# Patient Record
Sex: FEMALE | Race: WHITE | Employment: FULL TIME | URBAN - METROPOLITAN AREA
[De-identification: names, ages, dates, MRNs, and addresses within clinical notes are randomized per-mention and may not be internally consistent; named-entity substitution may affect disease eponyms.]

---

## 2018-01-08 ENCOUNTER — OFFICE VISIT (OUTPATIENT)
Dept: OBGYN CLINIC | Facility: CLINIC | Age: 43
End: 2018-01-08

## 2018-01-08 VITALS
SYSTOLIC BLOOD PRESSURE: 116 MMHG | BODY MASS INDEX: 26.48 KG/M2 | HEIGHT: 67 IN | DIASTOLIC BLOOD PRESSURE: 70 MMHG | WEIGHT: 168.69 LBS

## 2018-01-08 DIAGNOSIS — Z01.419 WELL WOMAN EXAM WITH ROUTINE GYNECOLOGICAL EXAM: Primary | ICD-10-CM

## 2018-01-08 DIAGNOSIS — Z12.31 SCREENING MAMMOGRAM, ENCOUNTER FOR: ICD-10-CM

## 2018-01-08 PROCEDURE — 99396 PREV VISIT EST AGE 40-64: CPT | Performed by: ADVANCED PRACTICE MIDWIFE

## 2018-01-08 RX ORDER — BUPROPION HYDROCHLORIDE 150 MG/1
TABLET ORAL
Refills: 0 | COMMUNITY
Start: 2018-01-05 | End: 2020-06-10

## 2018-01-08 RX ORDER — FLUOXETINE 10 MG/1
CAPSULE ORAL
Refills: 4 | COMMUNITY
Start: 2017-12-27 | End: 2020-06-10

## 2018-01-08 NOTE — PROGRESS NOTES
HPI:    Patient ID: Larissa Willis is a 38year old female. Is under a great deal stress due to mother dying of cancer. Has mirena and will be due to be replaced in 2020        Review of Systems   Constitutional: Negative. Respiratory: Negative.     Ca

## 2018-01-10 LAB — HPV I/H RISK 1 DNA SPEC QL NAA+PROBE: NEGATIVE

## 2019-01-09 ENCOUNTER — OFFICE VISIT (OUTPATIENT)
Dept: OBGYN CLINIC | Facility: CLINIC | Age: 44
End: 2019-01-09
Payer: COMMERCIAL

## 2019-01-09 VITALS
BODY MASS INDEX: 27 KG/M2 | WEIGHT: 174 LBS | DIASTOLIC BLOOD PRESSURE: 73 MMHG | SYSTOLIC BLOOD PRESSURE: 114 MMHG | HEART RATE: 57 BPM

## 2019-01-09 DIAGNOSIS — Z12.31 ENCOUNTER FOR SCREENING MAMMOGRAM FOR BREAST CANCER: Primary | ICD-10-CM

## 2019-01-09 DIAGNOSIS — F32.0 CURRENT MILD EPISODE OF MAJOR DEPRESSIVE DISORDER WITHOUT PRIOR EPISODE (HCC): ICD-10-CM

## 2019-01-09 DIAGNOSIS — Z01.419 ENCOUNTER FOR WELL WOMAN EXAM WITH ROUTINE GYNECOLOGICAL EXAM: ICD-10-CM

## 2019-01-09 PROCEDURE — 99396 PREV VISIT EST AGE 40-64: CPT | Performed by: ADVANCED PRACTICE MIDWIFE

## 2019-01-09 RX ORDER — BUPROPION HYDROCHLORIDE 150 MG/1
150 TABLET ORAL DAILY
Qty: 90 TABLET | Refills: 3 | Status: SHIPPED | OUTPATIENT
Start: 2019-01-09 | End: 2020-05-04

## 2019-01-09 RX ORDER — FLUOXETINE 10 MG/1
10 CAPSULE ORAL DAILY
Qty: 90 CAPSULE | Refills: 3 | Status: SHIPPED | OUTPATIENT
Start: 2019-01-09 | End: 2020-06-10

## 2019-01-09 NOTE — PROGRESS NOTES
HPI:    Patient ID: Magdalena Bridges is a 37year old female. Here for annual exam.      PMSFH reviewed and updated.   IUD    12 pt ROS was performed and noted in HPI otherwise negative    IUD for contraception    Needs refill of antidepressant, has been [facility] thyromegaly present. Cardiovascular: Normal rate, regular rhythm and normal heart sounds. No murmur heard. Pulmonary/Chest: Effort normal and breath sounds normal. No respiratory distress. She exhibits no tenderness.  Right breast exhibits no inverted capsule Rfl: 3   BuPROPion HCl ER, XL, 150 MG Oral Tablet 24 Hr Take 1 tablet (150 mg total) by mouth daily.  Disp: 90 tablet Rfl: 3   FLUoxetine HCl 10 MG Oral Cap TK 1 C PO QAM Disp:  Rfl: 4   BuPROPion HCl ER, XL, 150 MG Oral Tablet 24 Hr TK 1 T PO QAM D encounter. Meds This Visit:  Requested Prescriptions     Signed Prescriptions Disp Refills   • FLUoxetine HCl 10 MG Oral Cap 90 capsule 3     Sig: Take 1 capsule (10 mg total) by mouth daily.    • BuPROPion HCl ER, XL, 150 MG Oral Tablet 24 Hr 90 table

## 2020-05-02 DIAGNOSIS — F32.0 CURRENT MILD EPISODE OF MAJOR DEPRESSIVE DISORDER WITHOUT PRIOR EPISODE (HCC): ICD-10-CM

## 2020-05-04 RX ORDER — BUPROPION HYDROCHLORIDE 150 MG/1
TABLET ORAL
Qty: 90 TABLET | Refills: 3 | Status: SHIPPED | OUTPATIENT
Start: 2020-05-04 | End: 2020-06-10

## 2020-05-04 NOTE — TELEPHONE ENCOUNTER
Left detailed message for pt informing of a possible active script in Epic. Pt to call back if any questions.

## 2020-05-08 ENCOUNTER — TELEPHONE (OUTPATIENT)
Dept: OBGYN CLINIC | Facility: CLINIC | Age: 45
End: 2020-05-08

## 2020-06-10 ENCOUNTER — OFFICE VISIT (OUTPATIENT)
Dept: OBGYN CLINIC | Facility: CLINIC | Age: 45
End: 2020-06-10
Payer: COMMERCIAL

## 2020-06-10 VITALS
WEIGHT: 172 LBS | BODY MASS INDEX: 27 KG/M2 | SYSTOLIC BLOOD PRESSURE: 115 MMHG | DIASTOLIC BLOOD PRESSURE: 68 MMHG | HEIGHT: 67 IN

## 2020-06-10 DIAGNOSIS — Z12.31 SCREENING MAMMOGRAM, ENCOUNTER FOR: ICD-10-CM

## 2020-06-10 DIAGNOSIS — F32.0 CURRENT MILD EPISODE OF MAJOR DEPRESSIVE DISORDER WITHOUT PRIOR EPISODE (HCC): ICD-10-CM

## 2020-06-10 DIAGNOSIS — Z01.419 WOMEN'S ANNUAL ROUTINE GYNECOLOGICAL EXAMINATION: Primary | ICD-10-CM

## 2020-06-10 DIAGNOSIS — Z30.430 ENCOUNTER FOR IUD INSERTION: ICD-10-CM

## 2020-06-10 PROCEDURE — 58300 INSERT INTRAUTERINE DEVICE: CPT | Performed by: ADVANCED PRACTICE MIDWIFE

## 2020-06-10 PROCEDURE — 99396 PREV VISIT EST AGE 40-64: CPT | Performed by: ADVANCED PRACTICE MIDWIFE

## 2020-06-10 PROCEDURE — 58301 REMOVE INTRAUTERINE DEVICE: CPT | Performed by: ADVANCED PRACTICE MIDWIFE

## 2020-06-10 RX ORDER — FLUOXETINE 10 MG/1
10 TABLET, FILM COATED ORAL DAILY
COMMUNITY
End: 2020-06-10

## 2020-06-10 RX ORDER — FLUOXETINE 10 MG/1
10 TABLET, FILM COATED ORAL DAILY
Qty: 90 TABLET | Refills: 3 | Status: SHIPPED | OUTPATIENT
Start: 2020-06-10 | End: 2021-09-24

## 2020-06-10 RX ORDER — SERTRALINE HYDROCHLORIDE 100 MG/1
100 TABLET, FILM COATED ORAL DAILY
COMMUNITY
End: 2020-06-10

## 2020-06-10 RX ORDER — BUPROPION HYDROCHLORIDE 150 MG/1
150 TABLET, EXTENDED RELEASE ORAL 2 TIMES DAILY
COMMUNITY
End: 2020-06-10

## 2020-06-10 RX ORDER — BUPROPION HYDROCHLORIDE 150 MG/1
150 TABLET ORAL DAILY
Qty: 90 TABLET | Refills: 3 | Status: SHIPPED | OUTPATIENT
Start: 2020-06-10 | End: 2021-08-22

## 2020-06-10 NOTE — PROCEDURES
[facility], [facility]  Obstetrics and Gynecology  IUD Removal and IUD Insertion Procedure Note  Zeferino Choe CNM, ALEXANDRU    Philip Bertrand is a 53year old female presenting for IUD Removal and Insertion. Pelvic Exam Findings:  Cervix normal. Uterus AV.  [place] check.      Attila De Jesus, APRN  3:54 PM  6/10/2020

## 2020-06-10 NOTE — PROGRESS NOTES
HPI:    Patient ID: Kevyn Zhu is a 53year old female. Has not had any health problems this year. Is taking fluoxetine and buproprion for depression.       Needs a mirena replacement      Review of Systems   All other systems reviewed and are negativ No      Exercise: walking. Diet: doesn't watch              Current Outpatient Medications   Medication Sig Dispense Refill   • FLUoxetine HCl 10 MG Oral Tab Take 10 mg by mouth daily.      • buPROPion HCl ER, SR, 150 MG Oral Tablet 12 Hr Take 150 mg by mo labia. Uterus is not deviated, not enlarged and not tender. Cervix exhibits no motion tenderness, no discharge and no friability. Right adnexum displays no mass, no tenderness and no fullness. Left adnexum displays no mass, no tenderness and no fullness.

## 2020-06-23 ENCOUNTER — HOSPITAL ENCOUNTER (OUTPATIENT)
Dept: MAMMOGRAPHY | Age: 45
Discharge: HOME | End: 2020-06-23
Attending: ADVANCED PRACTICE MIDWIFE
Payer: COMMERCIAL

## 2020-06-23 DIAGNOSIS — Z12.31 SCREENING MAMMOGRAM, ENCOUNTER FOR: ICD-10-CM

## 2020-06-23 PROCEDURE — 77063 BREAST TOMOSYNTHESIS BI: CPT | Performed by: ADVANCED PRACTICE MIDWIFE

## 2020-06-23 PROCEDURE — 77067 SCR MAMMO BI INCL CAD: CPT | Performed by: ADVANCED PRACTICE MIDWIFE

## 2021-06-21 ENCOUNTER — OFFICE VISIT (OUTPATIENT)
Dept: OBGYN CLINIC | Facility: CLINIC | Age: 46
End: 2021-06-21
Payer: COMMERCIAL

## 2021-06-21 VITALS — DIASTOLIC BLOOD PRESSURE: 62 MMHG | SYSTOLIC BLOOD PRESSURE: 110 MMHG | BODY MASS INDEX: 28 KG/M2 | WEIGHT: 179 LBS

## 2021-06-21 DIAGNOSIS — Z01.419 ENCOUNTER FOR WELL WOMAN EXAM WITH ROUTINE GYNECOLOGICAL EXAM: Primary | ICD-10-CM

## 2021-06-21 DIAGNOSIS — Z13.21 ENCOUNTER FOR VITAMIN DEFICIENCY SCREENING: ICD-10-CM

## 2021-06-21 DIAGNOSIS — F32.A DEPRESSION, UNSPECIFIED DEPRESSION TYPE: ICD-10-CM

## 2021-06-21 DIAGNOSIS — Z12.31 ENCOUNTER FOR SCREENING MAMMOGRAM FOR BREAST CANCER: ICD-10-CM

## 2021-06-21 PROCEDURE — 99396 PREV VISIT EST AGE 40-64: CPT | Performed by: ADVANCED PRACTICE MIDWIFE

## 2021-06-21 PROCEDURE — 3074F SYST BP LT 130 MM HG: CPT | Performed by: ADVANCED PRACTICE MIDWIFE

## 2021-06-21 PROCEDURE — 3078F DIAST BP <80 MM HG: CPT | Performed by: ADVANCED PRACTICE MIDWIFE

## 2021-06-21 NOTE — PROGRESS NOTES
HPI/Subjective:   Patient ID: Keith Louie is a 50year old female. Here for annual.  Has had an increase in depression since staying home during covid.   Is seeking a referral for a psychiatrist. No other change in health in last year    [address] [facility] Mei reviewed by mouth daily. • PAZEO 0.7 % Ophthalmic Solution  (Patient not taking: Reported on 6/21/2021 )  4   • Multiple Vitamin (MULTI-VITAMIN) Oral Tab Take 1 tablet by mouth daily.  (Patient not taking: Reported on 6/21/2021 )     • Multiple Vitamins-Minerals

## 2021-06-30 ENCOUNTER — LAB ENCOUNTER (OUTPATIENT)
Dept: LAB | Facility: REFERENCE LAB | Age: 46
End: 2021-06-30
Attending: ADVANCED PRACTICE MIDWIFE
Payer: COMMERCIAL

## 2021-06-30 ENCOUNTER — HOSPITAL ENCOUNTER (OUTPATIENT)
Dept: MAMMOGRAPHY | Age: 46
Discharge: HOME | End: 2021-06-30
Attending: ADVANCED PRACTICE MIDWIFE
Payer: COMMERCIAL

## 2021-06-30 DIAGNOSIS — Z13.21 ENCOUNTER FOR VITAMIN DEFICIENCY SCREENING: ICD-10-CM

## 2021-06-30 DIAGNOSIS — Z01.419 ENCOUNTER FOR WELL WOMAN EXAM WITH ROUTINE GYNECOLOGICAL EXAM: ICD-10-CM

## 2021-06-30 DIAGNOSIS — Z12.31 ENCOUNTER FOR SCREENING MAMMOGRAM FOR BREAST CANCER: ICD-10-CM

## 2021-06-30 LAB
25(OH)D3 SERPL-MCNC: 28 NG/ML (ref 30–100)
ALBUMIN SERPL-MCNC: 3.6 G/DL (ref 3.4–5)
ALBUMIN/GLOB SERPL: 1.1 {RATIO} (ref 1–2)
ALP LIVER SERPL-CCNC: 31 U/L
ALT SERPL-CCNC: 20 U/L
ANION GAP SERPL CALC-SCNC: 6 MMOL/L (ref 0–18)
AST SERPL-CCNC: 14 U/L (ref 15–37)
BILIRUB SERPL-MCNC: 0.6 MG/DL (ref 0.1–2)
BUN BLD-MCNC: 14 MG/DL (ref 7–18)
BUN/CREAT SERPL: 16.1 (ref 10–20)
CALCIUM BLD-MCNC: 8.9 MG/DL (ref 8.5–10.1)
CHLORIDE SERPL-SCNC: 110 MMOL/L (ref 98–112)
CHOLEST SMN-MCNC: 209 MG/DL (ref ?–200)
CO2 SERPL-SCNC: 24 MMOL/L (ref 21–32)
CREAT BLD-MCNC: 0.87 MG/DL
DEPRECATED RDW RBC AUTO: 40.5 FL (ref 35.1–46.3)
ERYTHROCYTE [DISTWIDTH] IN BLOOD BY AUTOMATED COUNT: 12.2 % (ref 11–15)
EST. AVERAGE GLUCOSE BLD GHB EST-MCNC: 97 MG/DL (ref 68–126)
GLOBULIN PLAS-MCNC: 3.4 G/DL (ref 2.8–4.4)
GLUCOSE BLD-MCNC: 88 MG/DL (ref 70–99)
HBA1C MFR BLD HPLC: 5 % (ref ?–5.7)
HCT VFR BLD AUTO: 41.7 %
HDLC SERPL-MCNC: 63 MG/DL (ref 40–59)
HGB BLD-MCNC: 14 G/DL
LDLC SERPL CALC-MCNC: 134 MG/DL (ref ?–100)
M PROTEIN MFR SERPL ELPH: 7 G/DL (ref 6.4–8.2)
MCH RBC QN AUTO: 30.5 PG (ref 26–34)
MCHC RBC AUTO-ENTMCNC: 33.6 G/DL (ref 31–37)
MCV RBC AUTO: 90.8 FL
NONHDLC SERPL-MCNC: 146 MG/DL (ref ?–130)
OSMOLALITY SERPL CALC.SUM OF ELEC: 290 MOSM/KG (ref 275–295)
PATIENT FASTING Y/N/NP: YES
PATIENT FASTING Y/N/NP: YES
PLATELET # BLD AUTO: 235 10(3)UL (ref 150–450)
POTASSIUM SERPL-SCNC: 3.9 MMOL/L (ref 3.5–5.1)
RBC # BLD AUTO: 4.59 X10(6)UL
SODIUM SERPL-SCNC: 140 MMOL/L (ref 136–145)
TRIGL SERPL-MCNC: 67 MG/DL (ref 30–149)
TSI SER-ACNC: 1.94 MIU/ML (ref 0.36–3.74)
VLDLC SERPL CALC-MCNC: 12 MG/DL (ref 0–30)
WBC # BLD AUTO: 6 X10(3) UL (ref 4–11)

## 2021-06-30 PROCEDURE — 77063 BREAST TOMOSYNTHESIS BI: CPT | Performed by: ADVANCED PRACTICE MIDWIFE

## 2021-06-30 PROCEDURE — 83036 HEMOGLOBIN GLYCOSYLATED A1C: CPT

## 2021-06-30 PROCEDURE — 80053 COMPREHEN METABOLIC PANEL: CPT

## 2021-06-30 PROCEDURE — 85027 COMPLETE CBC AUTOMATED: CPT

## 2021-06-30 PROCEDURE — 82306 VITAMIN D 25 HYDROXY: CPT

## 2021-06-30 PROCEDURE — 80061 LIPID PANEL: CPT

## 2021-06-30 PROCEDURE — 36415 COLL VENOUS BLD VENIPUNCTURE: CPT

## 2021-06-30 PROCEDURE — 77067 SCR MAMMO BI INCL CAD: CPT | Performed by: ADVANCED PRACTICE MIDWIFE

## 2021-06-30 PROCEDURE — 84443 ASSAY THYROID STIM HORMONE: CPT

## 2021-08-04 PROBLEM — F33.1 MAJOR DEPRESSIVE DISORDER, RECURRENT, MODERATE (HCC): Status: ACTIVE | Noted: 2021-08-04

## 2021-08-20 DIAGNOSIS — F32.0 CURRENT MILD EPISODE OF MAJOR DEPRESSIVE DISORDER WITHOUT PRIOR EPISODE (HCC): ICD-10-CM

## 2021-08-22 RX ORDER — BUPROPION HYDROCHLORIDE 150 MG/1
TABLET ORAL
Qty: 90 TABLET | Refills: 3 | Status: SHIPPED | OUTPATIENT
Start: 2021-08-22

## 2021-09-24 RX ORDER — FLUOXETINE 10 MG/1
TABLET, FILM COATED ORAL
Qty: 90 TABLET | Refills: 3 | Status: SHIPPED | OUTPATIENT
Start: 2021-09-24

## 2023-04-25 ENCOUNTER — OFFICE VISIT (OUTPATIENT)
Dept: OBGYN CLINIC | Facility: CLINIC | Age: 48
End: 2023-04-25

## 2023-04-25 VITALS
WEIGHT: 182 LBS | DIASTOLIC BLOOD PRESSURE: 82 MMHG | BODY MASS INDEX: 28.56 KG/M2 | HEIGHT: 67 IN | HEART RATE: 50 BPM | SYSTOLIC BLOOD PRESSURE: 119 MMHG

## 2023-04-25 DIAGNOSIS — Z12.31 ENCOUNTER FOR SCREENING MAMMOGRAM FOR BREAST CANCER: ICD-10-CM

## 2023-04-25 DIAGNOSIS — Z12.11 SCREENING FOR COLON CANCER: ICD-10-CM

## 2023-04-25 DIAGNOSIS — Z30.431 IUD CHECK UP: ICD-10-CM

## 2023-04-25 DIAGNOSIS — Z01.419 WOMEN'S ANNUAL ROUTINE GYNECOLOGICAL EXAMINATION: Primary | ICD-10-CM

## 2023-04-25 PROCEDURE — 3074F SYST BP LT 130 MM HG: CPT | Performed by: ADVANCED PRACTICE MIDWIFE

## 2023-04-25 PROCEDURE — 3008F BODY MASS INDEX DOCD: CPT | Performed by: ADVANCED PRACTICE MIDWIFE

## 2023-04-25 PROCEDURE — 3079F DIAST BP 80-89 MM HG: CPT | Performed by: ADVANCED PRACTICE MIDWIFE

## 2023-04-25 PROCEDURE — 99396 PREV VISIT EST AGE 40-64: CPT | Performed by: ADVANCED PRACTICE MIDWIFE

## 2023-05-30 ENCOUNTER — PATIENT MESSAGE (OUTPATIENT)
Dept: OBGYN CLINIC | Facility: CLINIC | Age: 48
End: 2023-05-30

## 2023-05-30 NOTE — TELEPHONE ENCOUNTER
From: Avtar Knott  To: Sam Mosher CNM  Sent: 5/30/2023 9:32 AM CDT  Subject: Colonoscopy    I have not yet been contacted by GI regarding scheduling a colonoscopy. I know that we discussed this at my appt and I thought you said that I should wait to hear from them. Should I be contacting them?

## 2023-05-31 ENCOUNTER — HOSPITAL ENCOUNTER (OUTPATIENT)
Dept: MAMMOGRAPHY | Age: 48
Discharge: HOME | End: 2023-05-31
Attending: ADVANCED PRACTICE MIDWIFE
Payer: COMMERCIAL

## 2023-05-31 DIAGNOSIS — Z12.31 ENCOUNTER FOR SCREENING MAMMOGRAM FOR BREAST CANCER: ICD-10-CM

## 2023-05-31 PROCEDURE — 77063 BREAST TOMOSYNTHESIS BI: CPT | Performed by: ADVANCED PRACTICE MIDWIFE

## 2023-05-31 PROCEDURE — 77067 SCR MAMMO BI INCL CAD: CPT | Performed by: ADVANCED PRACTICE MIDWIFE

## 2023-06-30 ENCOUNTER — NURSE ONLY (OUTPATIENT)
Facility: CLINIC | Age: 48
End: 2023-06-30

## 2023-06-30 DIAGNOSIS — Z12.11 COLON CANCER SCREENING: Primary | ICD-10-CM

## 2023-06-30 RX ORDER — SODIUM, POTASSIUM,MAG SULFATES 17.5-3.13G
SOLUTION, RECONSTITUTED, ORAL ORAL
Qty: 1 EACH | Refills: 0 | Status: SHIPPED | OUTPATIENT
Start: 2023-06-30

## 2023-06-30 NOTE — PROGRESS NOTES
Dx: average risk crc screening  Colonoscopy with MAC sedation  Split dose suprep, if suprep not covered then golytely or moviprep, sent to pharmacy  Please review prep instructions with patient    - If the patient is taking oral diabetic medications then they should HOLD diabetic medications the night before and/or the day of the procedure - If the patient is on insulin, please have the PCP or endocrinologist assist with management of dosing prior to the procedure.  - NO herbal supplements or weight loss medications x 7 days prior to the procedure. - If patient is taking Xarelto OR Eliquis then it needs to be helf for 48 hours prior to the procedure --> Should get approval from the prescribing physician (PCP or cardiologist) to hold this medication prior to the procedure. - If the patient is taking Coumadin then it needs to be on hold Coumadin for 5 days prior to the procedure --> Should get approval from the prescribing physician (PCP or cardiologist) to hold this medication prior to the procedure. *If the bowel prep prescribed is too expensive/not covered by the patient's insurance please pend an alternative and I will sign that order. Thank you.

## 2023-07-05 NOTE — PROGRESS NOTES
Scheduled for:  Colonoscopy-screen 31014    Provider Name:  Dr. Sy Harris   Date: Monday, 10/23/2023  Location:  [facility]  Sedation:  MAC  Time:  9:00 AM (pt is aware to arrive at 8:00 AM  Prep:  Split dose Suprep E-Prescribing Status: Receipt confirmed by pharmacy (6/30/2023 11:09 AM CDT)   Meds/Allergies Reconciled?:  Physician reviewed  Diagnosis with codes:  Colorectal cancer screening Z12.11  Was patient informed to call insurance with codes (Y/N):  I confirmed [employer] insurance with this patient. Referral sent?:  Referral was sent at the time of electronic surgical scheduling. [facility] or [phone]  notified?:  I sent an electronic request to Endo Scheduling and received a confirmation today. Medication Orders:  DO NOT TAKE: Iron pills, herbal supplements, multi-vitamins, or diet medications (i.e. Phentermine/Vyvanse) for 7 days before exam.  Misc Orders:  Patient was informed about the new cancellation policy for his/her procedure. Patient was also given a copy of the cancellation policy at the time of the appointment and verbalized understanding. Further instructions given by staff:  I discussed the prep instructions with the patient which *sheverbally understood and is aware that I will send the instructions today via [employer].

## 2023-10-23 ENCOUNTER — ANESTHESIA (OUTPATIENT)
Dept: ENDOSCOPY | Age: 48
End: 2023-10-23
Payer: COMMERCIAL

## 2023-10-23 ENCOUNTER — ANESTHESIA EVENT (OUTPATIENT)
Dept: ENDOSCOPY | Age: 48
End: 2023-10-23
Payer: COMMERCIAL

## 2023-10-23 ENCOUNTER — HOSPITAL ENCOUNTER (OUTPATIENT)
Age: 48
Setting detail: HOSPITAL OUTPATIENT SURGERY
Discharge: HOME | End: 2023-10-23
Attending: STUDENT IN AN ORGANIZED HEALTH CARE EDUCATION/TRAINING PROGRAM | Admitting: STUDENT IN AN ORGANIZED HEALTH CARE EDUCATION/TRAINING PROGRAM

## 2023-10-23 VITALS
RESPIRATION RATE: 15 BRPM | OXYGEN SATURATION: 99 % | DIASTOLIC BLOOD PRESSURE: 72 MMHG | HEART RATE: 52 BPM | WEIGHT: 173 LBS | BODY MASS INDEX: 27.15 KG/M2 | HEIGHT: 67 IN | SYSTOLIC BLOOD PRESSURE: 106 MMHG

## 2023-10-23 DIAGNOSIS — Z12.11 COLON CANCER SCREENING: ICD-10-CM

## 2023-10-23 LAB — B-HCG UR QL: NEGATIVE

## 2023-10-23 PROCEDURE — 45380 COLONOSCOPY AND BIOPSY: CPT | Performed by: STUDENT IN AN ORGANIZED HEALTH CARE EDUCATION/TRAINING PROGRAM

## 2023-10-23 PROCEDURE — 45385 COLONOSCOPY W/LESION REMOVAL: CPT | Performed by: STUDENT IN AN ORGANIZED HEALTH CARE EDUCATION/TRAINING PROGRAM

## 2023-10-23 PROCEDURE — 99070 SPECIAL SUPPLIES PHYS/QHP: CPT | Performed by: STUDENT IN AN ORGANIZED HEALTH CARE EDUCATION/TRAINING PROGRAM

## 2023-10-23 RX ORDER — SODIUM CHLORIDE, SODIUM LACTATE, POTASSIUM CHLORIDE, CALCIUM CHLORIDE 600; 310; 30; 20 MG/100ML; MG/100ML; MG/100ML; MG/100ML
INJECTION, SOLUTION INTRAVENOUS CONTINUOUS
Status: DISCONTINUED | OUTPATIENT
Start: 2023-10-23 | End: 2023-10-23

## 2023-10-23 RX ORDER — LIDOCAINE HYDROCHLORIDE 10 MG/ML
INJECTION, SOLUTION EPIDURAL; INFILTRATION; INTRACAUDAL; PERINEURAL AS NEEDED
Status: DISCONTINUED | OUTPATIENT
Start: 2023-10-23 | End: 2023-10-23 | Stop reason: SURG

## 2023-10-23 RX ADMIN — LIDOCAINE HYDROCHLORIDE 50 MG: 10 INJECTION, SOLUTION EPIDURAL; INFILTRATION; INTRACAUDAL; PERINEURAL at 08:53:00

## 2023-10-23 RX ADMIN — SODIUM CHLORIDE, SODIUM LACTATE, POTASSIUM CHLORIDE, CALCIUM CHLORIDE: 600; 310; 30; 20 INJECTION, SOLUTION INTRAVENOUS at 08:50:00

## 2023-10-23 RX ADMIN — SODIUM CHLORIDE, SODIUM LACTATE, POTASSIUM CHLORIDE, CALCIUM CHLORIDE: 600; 310; 30; 20 INJECTION, SOLUTION INTRAVENOUS at 09:19:00

## 2023-10-23 NOTE — H&P
History & Physical Examination    Patient Name: Juliann Varela  MRN: [id number]  CSN: [id number]  YOB: 1975    Diagnosis: screening for colon cancer    FLUoxetine HCl 20 MG Oral Tab, Take 1 tablet (20 mg total) by mouth daily. , Disp: 30 tablet, Rfl: 5, 10/22/2023  traZODone 50 MG Oral Tab, Take 0.25-0.5 tablets (12.5-25 mg total) by mouth nightly as needed for Sleep., Disp: 30 tablet, Rfl: 1, PRN  buPROPion  MG Oral Tablet 24 Hr, Take 1 tablet (150 mg total) by mouth daily. , Disp: 90 tablet, Rfl: 3, 10/22/2023  Cholecalciferol (VITAMIN D) 50 MCG (2000 UT) Oral Cap, Take 2 capsules (4,000 Units total) by mouth., Disp: 30 capsule, Rfl: 0  omega-3 fatty acids (FISH OIL) 1000 MG Oral Cap, Take 1,000 mg by mouth daily. , Disp: , Rfl:   Na Sulfate-K Sulfate-Mg Sulf (SUPREP BOWEL PREP KIT) 17.5-3.13-1.6 GM/177ML Oral Solution, Take as discussed in clinic, Disp: 1 each, Rfl: 0  Meloxicam 15 MG Oral Tab, Take 15 mg by mouth daily with food.  (Patient not taking: Reported on 2023), Disp: , Rfl:       lactated ringers infusion, , Intravenous, Continuous        Allergies:   Morphine                RASH    Past Medical History:   Diagnosis Date    Depression     Osteoarthritis 2022    PONV (postoperative nausea and vomiting)     after general anesthesia -per patient    Ruptured lumbar disc 2014     Past Surgical History:   Procedure Laterality Date          2010    [address] [place] [address], [facility]    ACL REPAIR     Family History   Problem Relation Age of Onset    Lipids Father     Alcohol and Other Disorders Associated Father     Hypertension Mother     Thyroid Disorder Mother     Depression Mother     Cancer Mother 70        endometrial cancer    Diabetes Maternal Grandfather     Thyroid Disorder Sister     Depression Sister     Anxiety Sister     ADHD Son     Anxiety Son     Depression Maternal Grandmother     Depression Brother     Depression Niece     Breast Cancer Neg Ovarian Cancer Neg      Social History    Tobacco Use      Smoking status: Never      Smokeless tobacco: Never    Alcohol use: Yes      Comment: socially      SYSTEM Check if Review is Normal Check if Physical Exam is Normal If not normal, please explain:   YAKOV Mendoza[employer]Pet[employer] ] [ Ramsey Lerner  Mendoza[employer]Petties ] [ Chalo Cano Mendoza[employer]Petties ] [ Anne Mckinley Mendoza[employer]Petties ] [ Irene Peralta Mendoza[employer]Petties ] [ Norman Hirsch Mendoza[employer]Petties ] [ X]    OTHER        I have discussed the risks and benefits and alternatives of the procedure with the patient/family. They understand and agree to proceed with plan of care. I have reviewed the History and Physical done within the last 30 days. Any changes noted above.     Luz Briceño MD  [facility], [facility] Gastroenterology

## 2023-10-23 NOTE — OPERATIVE REPORT
COLONOSCOPY REPORT    Vamshi Estrada     10/12/1975 Age 46year old   PCP No primary care provider on file. Endoscopist Isabella Munson MD       Date of procedure: 10/23/23    Procedure: Colonoscopy w/ cold snare polypectomy, cold forceps polypectomy    Pre-operative diagnosis: screening    Post-operative diagnosis: polyps    Medications: MAC    Withdrawal time: 19 minutes    Procedure:  Informed consent was obtained from the patient after the risks of the procedure were discussed, including but not limited to bleeding, perforation, aspiration, infection, or possibility of a missed lesion. After discussions of the risks/benefits and alternatives to this procedure, as well as the planned sedation, the patient was placed in the left lateral decubitus position and begun on continuous blood pressure pulse oximetry and EKG monitoring and this was maintained throughout the procedure. Once an adequate level of sedation was obtained a digital rectal exam was completed. Then the lubricated tip of the [id number] diagnostic video colonoscope was inserted and advanced without difficulty to the cecum using water immersion and CO2 insufflation technique. The cecum was identified by localizing the trifold, the appendix and the ileocecal valve. Withdrawal was begun with thorough washing and careful examination of the colonic walls and folds. A routine second examination of the cecum/ascending colon was performed. Photodocumentation was obtained. The bowel prep was good. Views of the colon were good with washing. I then carefully withdrew the instrument from the patient who tolerated the procedure well. Complications: none. Findings:   1. 3 polyps noted as follows:      A. 2 mm polyp in the cecum; sessile morphology; cold forceps polypectomy performed, polyp retrieved. B. 8 mm polyp in the transverse colon; sessile morphology; cold snare polypectomy performed, polyp retrieved.       C. 2 mm polyp in the transverse colon; sessile morphology; cold forceps polypectomy performed, polyp retrieved. 2. Diverticulosis: none visualized. 3. Ileocecal valve appeared healthy and normal. Terminal ileum was intubated and appeared normal.    4. The colonic mucosa throughout the colon showed normal vascular pattern, without evidence of angioectasias or inflammation. 5. A retroflexed view of the rectum was unremarkable    6. REINA: normal rectal tone, no masses palpated. Impression:   3 polyps removed ranging in size from 2 mm to 8 mm. Normal terminal ileum. The colon was otherwise normal with glistening mucosa and intact vascular pattern. Recommend:  Await pathology. The interval for the next colonoscopy will be determined after reviewing pathology. If new signs or symptoms develop, colonoscopy may need to be repeated sooner. High fiber diet. Monitor for blood in the stool. If having more than just tinge of blood, call office or go to the ER. Repeat colonoscopy in 3 to 5 years.    >>>If tissue was obtained and you have not received your pathology results either by phone or letter within 2 weeks, please call our office at [phone].     Specimens: polyps    Blood loss: <1 ml

## 2023-10-23 NOTE — DISCHARGE INSTRUCTIONS

## 2023-10-23 NOTE — ANESTHESIA POSTPROCEDURE EVALUATION
Patient: Debby Calderon    Procedure Summary       Date: 10/23/23 Room / Location: [facility] ENDOSCOPY 02 / [facility] ENDO    Anesthesia Start: 5520 Anesthesia Stop: 3622    Procedure: COLONOSCOPY Diagnosis:       Colon cancer screening      (polyps)    Surgeons: Jing Horowitz MD Anesthesiologist: Tiff Farias MD    Anesthesia Type: MAC ASA Status: 2            Anesthesia Type: MAC    Vitals Value Taken Time   BP 91/52 10/23/23 0923   Temp 36.8 10/23/23 0926   Pulse 61 10/23/23 0925   Resp 14 10/23/23 0925   SpO2 98 % 10/23/23 0925   Vitals shown include unfiled device data.     [address] AN Post Evaluation:   Patient Evaluated in PACU  Patient Participation: waiting for patient participation  Level of Consciousness: sleepy but conscious  Pain Score: 0  Pain Management: adequate  Airway Patency:patent  Yes    Cardiovascular Status: acceptable and hemodynamically stable  Respiratory Status: acceptable, spontaneous ventilation and nonlabored ventilation  Postoperative Hydration euvolemic      Cristobal Gail Vázquez MD  10/23/2023 9:26 AM

## 2023-10-25 ENCOUNTER — TELEPHONE (OUTPATIENT)
Facility: CLINIC | Age: 48
End: 2023-10-25

## 2023-10-25 NOTE — PROGRESS NOTES
Two adenomatous polyps resected on recent colonoscopy, needs 5 year follow up. Mychart sent to patient. GI Staff:    Can you please place recall for this patient to have a colonoscopy in 5 years. Thank you.     Eduardo Contreras MD

## 2023-10-25 NOTE — TELEPHONE ENCOUNTER
----- Message from Alysha Moody MD sent at 10/25/2023 11:53 AM CDT -----  Two adenomatous polyps resected on recent colonoscopy, needs 5 year follow up. Mychart sent to patient. GI Staff:    Can you please place recall for this patient to have a colonoscopy in 5 years. Thank you.     Alysha Moody MD

## 2023-10-25 NOTE — TELEPHONE ENCOUNTER
Health Maintenance Updated. 5 year colonoscopy recall entered into patient outreach in [address] [place] [address] Rd. Next colonoscopy will be due 10/23/2028.     Patient viewed result note from Dr. Porfirio Harris in [address]:  Seen by patient Mireille Ríos on 10/25/2023  2:22 PM

## 2023-12-07 ENCOUNTER — MED REC SCAN ONLY (OUTPATIENT)
Facility: CLINIC | Age: 48
End: 2023-12-07

## 2024-05-08 ENCOUNTER — OFFICE VISIT (OUTPATIENT)
Dept: OBGYN CLINIC | Facility: CLINIC | Age: 49
End: 2024-05-08

## 2024-05-08 VITALS
DIASTOLIC BLOOD PRESSURE: 70 MMHG | WEIGHT: 187 LBS | SYSTOLIC BLOOD PRESSURE: 107 MMHG | HEIGHT: 67 IN | BODY MASS INDEX: 29.35 KG/M2 | HEART RATE: 60 BPM

## 2024-05-08 DIAGNOSIS — Z12.31 ENCOUNTER FOR SCREENING MAMMOGRAM FOR BREAST CANCER: ICD-10-CM

## 2024-05-08 DIAGNOSIS — Z01.419 WOMEN'S ANNUAL ROUTINE GYNECOLOGICAL EXAMINATION: Primary | ICD-10-CM

## 2024-05-08 DIAGNOSIS — Z30.431 IUD CHECK UP: ICD-10-CM

## 2024-05-08 PROCEDURE — 3078F DIAST BP <80 MM HG: CPT | Performed by: ADVANCED PRACTICE MIDWIFE

## 2024-05-08 PROCEDURE — 99396 PREV VISIT EST AGE 40-64: CPT | Performed by: ADVANCED PRACTICE MIDWIFE

## 2024-05-08 PROCEDURE — 3008F BODY MASS INDEX DOCD: CPT | Performed by: ADVANCED PRACTICE MIDWIFE

## 2024-05-08 PROCEDURE — 3074F SYST BP LT 130 MM HG: CPT | Performed by: ADVANCED PRACTICE MIDWIFE

## 2024-05-08 NOTE — PROGRESS NOTES
Mary Maharaj is a 48 year old female.    HPI:       Mary Maharaj is a 48 year old female.   No LMP recorded. (Menstrual status: IUD - Intrauterine Device).    Chief Complaint   Patient presents with    Gyn Exam     Annual exam     Pt reports occasional hot flashes and occasional spotting with IUD    Relationship: not sexually active  Contraception: IUD  Abuse: denies  Diet: well balanced  Exercise: regular   Denies excessive ETOH use, no marijuana, smoking, vapping or any non prescriptive drug use.   Family hx of breast or ovarian CA: **       Hx Prior Abnormal Pap: No  Pap Date: 21  Pap Result Notes: negative        Menarche: 13  Period Cycle (Days): no cycles on iud  Use of Birth Control (if yes, specify type): Mirena IUD  Hx Prior Abnormal Pap: No  Pap Date: 21  Pap Result Notes: negative      HISTORY:  Past Medical History:    Depression    Osteoarthritis    PONV (postoperative nausea and vomiting)    after general anesthesia -per patient    Ruptured lumbar disc      Past Surgical History:   Procedure Laterality Date          ,     Colonoscopy N/A 10/23/2023    Procedure: COLONOSCOPY;  Surgeon: Mele Pinto MD;  Location: [facility]    Other surgical history  , ,     ACL REPAIR      Family History   Problem Relation Age of Onset    Lipids Father     Alcohol and Other Disorders Associated Father     Hypertension Mother     Thyroid Disorder Mother     Depression Mother     Cancer Mother 71        endometrial cancer    Diabetes Maternal Grandfather     Thyroid Disorder Sister     Depression Sister     Anxiety Sister     ADHD Son     Anxiety Son     Depression Maternal Grandmother     Depression Brother     Depression Niece     Breast Cancer Neg     Ovarian Cancer Neg       Social History:   Social History     Socioeconomic History    Marital status:    Tobacco Use    Smoking status: Never    Smokeless tobacco: Never   Vaping Use    Vaping status: Never  Used   Substance and Sexual Activity    Alcohol use: Yes     Comment: socially    Drug use: No        Medications (Active prior to today's visit):  Current Outpatient Medications   Medication Sig Dispense Refill    buPROPion  MG Oral Tablet 24 Hr Take 1 tablet (150 mg total) by mouth daily. 90 tablet 1    FLUoxetine (PROZAC) 10 MG Oral Cap Take 1 capsule (10 mg total) by mouth every morning. Take along with 20mg tablet to equal 30mg by mouth daily. 90 capsule 1    FLUoxetine HCl 20 MG Oral Tab Take 1 tablet (20 mg total) by mouth daily. 90 tablet 1    traZODone 50 MG Oral Tab Take 0.5 tablets (25 mg total) by mouth nightly as needed for Sleep. 45 tablet 1    Cholecalciferol (VITAMIN D) 50 MCG (2000 UT) Oral Cap Take 2 capsules (4,000 Units total) by mouth. 30 capsule 0    omega-3 fatty acids (FISH OIL) 1000 MG Oral Cap Take 1,000 mg by mouth daily.         Allergies:  Allergies   Allergen Reactions    Morphine RASH, NAUSEA AND VOMITING and UNKNOWN         ROS:     Review of Systems   Constitutional: Negative.    Respiratory: Negative.     Cardiovascular: Negative.    Endocrine: Negative.    Genitourinary: Negative.          PHYSICAL EXAM:     Vitals:    05/08/24 1402   BP: 107/70   Pulse: 60     Physical Exam  Vitals reviewed.   Constitutional:       General: She is not in acute distress.     Appearance: Normal appearance. She is not ill-appearing, toxic-appearing or diaphoretic.   Cardiovascular:      Rate and Rhythm: Normal rate.   Pulmonary:      Effort: Pulmonary effort is normal.   Chest:   Breasts:     Right: Normal.   Genitourinary:     General: Normal vulva.      Exam position: Lithotomy position.      Labia:         Right: No rash, tenderness, lesion or injury.         Left: No rash, tenderness, lesion or injury.       Vagina: Normal.      Cervix: Normal.      Comments: IUD strings visualized  Skin:     General: Skin is warm and dry.   Neurological:      Mental Status: She is alert and oriented to  person, place, and time.          ASSESSMENT/PLAN:      Diagnoses and all orders for this visit:    Women's annual routine gynecological examination    IUD check up    Encounter for screening mammogram for breast cancer  -     [facility] SALIMA 2D+3D SCREENING BILAT (CPT=77067/92832); Future        Reviewed PAP guidelines recommending q 5 year screening with HPV testing.   Discussed breast awareness and SBE, and recommendations regarding mammography.  Order provided for screening mammogram.  Discussed weight management and regular exercise  Discussed screening colonoscopy completed  Offered patient STI screening and she declines  Reviewed perimenopause / menopause and hormone testing if needed           5/8/2024  Stephanie Jerez CNM

## 2025-01-08 ENCOUNTER — HOSPITAL ENCOUNTER (OUTPATIENT)
Dept: MAMMOGRAPHY | Age: 50
Discharge: HOME | End: 2025-01-08
Attending: ADVANCED PRACTICE MIDWIFE
Payer: COMMERCIAL

## 2025-01-08 DIAGNOSIS — Z12.31 ENCOUNTER FOR SCREENING MAMMOGRAM FOR BREAST CANCER: ICD-10-CM

## 2025-01-08 PROCEDURE — 77067 SCR MAMMO BI INCL CAD: CPT | Performed by: ADVANCED PRACTICE MIDWIFE

## 2025-01-08 PROCEDURE — 77063 BREAST TOMOSYNTHESIS BI: CPT | Performed by: ADVANCED PRACTICE MIDWIFE

## 2025-06-18 ENCOUNTER — LAB ENCOUNTER (OUTPATIENT)
Dept: LAB | Facility: HOSPITAL | Age: 50
End: 2025-06-18
Attending: ADVANCED PRACTICE MIDWIFE
Payer: COMMERCIAL

## 2025-06-18 ENCOUNTER — OFFICE VISIT (OUTPATIENT)
Dept: OBGYN CLINIC | Facility: CLINIC | Age: 50
End: 2025-06-18
Payer: COMMERCIAL

## 2025-06-18 VITALS
BODY MASS INDEX: 23.39 KG/M2 | HEIGHT: 67 IN | HEART RATE: 60 BPM | DIASTOLIC BLOOD PRESSURE: 69 MMHG | WEIGHT: 149 LBS | SYSTOLIC BLOOD PRESSURE: 102 MMHG

## 2025-06-18 DIAGNOSIS — Z13.29 SCREENING FOR THYROID DISORDER: ICD-10-CM

## 2025-06-18 DIAGNOSIS — Z01.419 WOMEN'S ANNUAL ROUTINE GYNECOLOGICAL EXAMINATION: ICD-10-CM

## 2025-06-18 DIAGNOSIS — Z13.220 SCREENING FOR LIPID DISORDERS: ICD-10-CM

## 2025-06-18 DIAGNOSIS — Z12.31 SCREENING MAMMOGRAM FOR BREAST CANCER: ICD-10-CM

## 2025-06-18 DIAGNOSIS — Z13.228 SCREENING FOR METABOLIC DISORDER: ICD-10-CM

## 2025-06-18 DIAGNOSIS — Z01.419 WOMEN'S ANNUAL ROUTINE GYNECOLOGICAL EXAMINATION: Primary | ICD-10-CM

## 2025-06-18 DIAGNOSIS — N95.1 PERIMENOPAUSAL VASOMOTOR SYMPTOMS: ICD-10-CM

## 2025-06-18 DIAGNOSIS — Z13.1 SCREENING FOR DIABETES MELLITUS: ICD-10-CM

## 2025-06-18 DIAGNOSIS — Z13.0 SCREENING FOR DEFICIENCY ANEMIA: ICD-10-CM

## 2025-06-18 DIAGNOSIS — Z30.431 IUD CHECK UP: ICD-10-CM

## 2025-06-18 LAB
ALBUMIN SERPL-MCNC: 4.7 G/DL (ref 3.2–4.8)
ALBUMIN/GLOB SERPL: 2 {RATIO} (ref 1–2)
ALP LIVER SERPL-CCNC: 45 U/L (ref 41–108)
ALT SERPL-CCNC: 17 U/L (ref 10–49)
ANION GAP SERPL CALC-SCNC: 6 MMOL/L (ref 0–18)
AST SERPL-CCNC: 24 U/L (ref ?–34)
BILIRUB SERPL-MCNC: 0.7 MG/DL (ref 0.3–1.2)
BUN BLD-MCNC: 15 MG/DL (ref 9–23)
BUN/CREAT SERPL: 15.3 (ref 10–20)
CALCIUM BLD-MCNC: 9.7 MG/DL (ref 8.7–10.4)
CHLORIDE SERPL-SCNC: 104 MMOL/L (ref 98–112)
CHOLEST SERPL-MCNC: 213 MG/DL (ref ?–200)
CO2 SERPL-SCNC: 29 MMOL/L (ref 21–32)
CREAT BLD-MCNC: 0.98 MG/DL (ref 0.55–1.02)
DEPRECATED RDW RBC AUTO: 41.4 FL (ref 35.1–46.3)
EGFRCR SERPLBLD CKD-EPI 2021: 70 ML/MIN/1.73M2 (ref 60–?)
ERYTHROCYTE [DISTWIDTH] IN BLOOD BY AUTOMATED COUNT: 12.2 % (ref 11–15)
EST. AVERAGE GLUCOSE BLD GHB EST-MCNC: 100 MG/DL (ref 68–126)
FASTING PATIENT LIPID ANSWER: NO
FASTING STATUS PATIENT QL REPORTED: NO
GLOBULIN PLAS-MCNC: 2.3 G/DL (ref 2–3.5)
GLUCOSE BLD-MCNC: 86 MG/DL (ref 70–99)
HBA1C MFR BLD: 5.1 % (ref ?–5.7)
HCT VFR BLD AUTO: 43.3 % (ref 35–48)
HDLC SERPL-MCNC: 61 MG/DL (ref 40–59)
HGB BLD-MCNC: 14.4 G/DL (ref 12–16)
LDLC SERPL CALC-MCNC: 132 MG/DL (ref ?–100)
MCH RBC QN AUTO: 30.6 PG (ref 26–34)
MCHC RBC AUTO-ENTMCNC: 33.3 G/DL (ref 31–37)
MCV RBC AUTO: 91.9 FL (ref 80–100)
NONHDLC SERPL-MCNC: 152 MG/DL (ref ?–130)
OSMOLALITY SERPL CALC.SUM OF ELEC: 288 MOSM/KG (ref 275–295)
PLATELET # BLD AUTO: 265 10(3)UL (ref 150–450)
POTASSIUM SERPL-SCNC: 4.1 MMOL/L (ref 3.5–5.1)
PROT SERPL-MCNC: 7 G/DL (ref 5.7–8.2)
RBC # BLD AUTO: 4.71 X10(6)UL (ref 3.8–5.3)
SODIUM SERPL-SCNC: 139 MMOL/L (ref 136–145)
TRIGL SERPL-MCNC: 111 MG/DL (ref 30–149)
TSI SER-ACNC: 2.16 UIU/ML (ref 0.55–4.78)
VLDLC SERPL CALC-MCNC: 20 MG/DL (ref 0–30)
WBC # BLD AUTO: 6.8 X10(3) UL (ref 4–11)

## 2025-06-18 PROCEDURE — 99396 PREV VISIT EST AGE 40-64: CPT | Performed by: ADVANCED PRACTICE MIDWIFE

## 2025-06-18 PROCEDURE — 83036 HEMOGLOBIN GLYCOSYLATED A1C: CPT | Performed by: ADVANCED PRACTICE MIDWIFE

## 2025-06-18 PROCEDURE — 85027 COMPLETE CBC AUTOMATED: CPT | Performed by: ADVANCED PRACTICE MIDWIFE

## 2025-06-18 PROCEDURE — 3078F DIAST BP <80 MM HG: CPT | Performed by: ADVANCED PRACTICE MIDWIFE

## 2025-06-18 PROCEDURE — 3074F SYST BP LT 130 MM HG: CPT | Performed by: ADVANCED PRACTICE MIDWIFE

## 2025-06-18 PROCEDURE — 80061 LIPID PANEL: CPT | Performed by: ADVANCED PRACTICE MIDWIFE

## 2025-06-18 PROCEDURE — 84443 ASSAY THYROID STIM HORMONE: CPT | Performed by: ADVANCED PRACTICE MIDWIFE

## 2025-06-18 PROCEDURE — 3008F BODY MASS INDEX DOCD: CPT | Performed by: ADVANCED PRACTICE MIDWIFE

## 2025-06-18 PROCEDURE — 80053 COMPREHEN METABOLIC PANEL: CPT | Performed by: ADVANCED PRACTICE MIDWIFE

## 2025-06-18 NOTE — PATIENT INSTRUCTIONS
Treating Hot Flashes  Hot flashes are the most common bothersome symptom of menopause. Your hot flashes may occur during   the day or at night (also known as night sweats). Your hot flashes may be mild and tolerable, moderate and   troublesome, or severe and debilitating. Hot flashes get better with time. Although most women have hot   flashes for a few years, some women have them for decades. It is not known why some women have   severe hot flashes for many years while others have no hot flashes or mild ones that resolve quickly. If your   hot flashes are mild or moderate, you may find relief by changing your lifestyle. If you have severe hot   flashes, you may still benefit from lifestyle changes, but also may choose to take a nonprescription remedy   or a prescription medication, including hormones to help you manage your symptoms.  Lifestyle changes  Researchers find that women with hot flashes have more sensitive thermostats in their brain, so are   comfortable only in a small range of temperatures. Staying cool and reducing stress are the principal   lifestyle changes to treat your hot flashes. Some women can find relief with these options:  ? Avoid warm rooms, hot drinks, hot foods, alcohol, caffeine, excess stress, and cigarette smoking.   Wear layers of clothing made from light, breathable fabrics, removing a layer or two when you’re hot   and replacing them when you’re cooler. Cooling products, including sprays, gels, and the Chillow   pillow may be helpful.  ? To reduce stress and promote more restful sleep, exercise regularly, but not too close to bedtime.  Meditation, yoga, qigong, jania chi, biofeedback, acupuncture, or massage also will lower your stress   levels.  ? When a hot flash is starting, try “paced respiration”--slow, deep, abdominal breathing, in through   your nose and out through your mouth. Breathe only 5 to 7 times per minute, much more slowly than   usual.  ? Try different strategies to  stay cool while sleeping. Dress in light, breathable nightclothes. Use   layered bedding that can be easily removed during the night. Cool down with a bedside fan. Keep a  frozen cold pack or bag of frozen peas under your pillow, and turn the pillow often so that your head   is always resting on a cool surface. If you wake at night, sip cool water. Try different techniques for   getting back to sleep, such as meditation, paced respiration, or getting out of bed and reading until   you become sleepy.  ? Women who are overweight have more hot flashes, so maintain a healthy weight and exercise   regularly to decrease bothersome hot flashes and improve your overall health.   Nonprescription remedies  Although many nonprescription remedies reduce hot flashes, it’s likely that this is because of the “placebo   effect.” When nonprescription treatments are studied scientifically, they typically are as effective as a   placebo (inactive medication). Even if relief is because of the placebo effect, you can expect your hot   flashes to decrease by approximately 30% with most nonprescription remedies such as soy, herbs, or   acupuncture.  Nonprescription products do not receive careful oversight from the government and generally are not   studied carefully enough to know all potential risks and side effects, especially with long-term use. Consider   purchasing products made in [place] that follow good manufacturing practices. Let your healthcare   provider know if you are taking a nonprescription remedy.Nonprescription remedies you may consider for hot flash relief include  ? Soy: Eat one or two servings of soy foods daily (containing isoflavones), such as low-fat varieties of   tofu, tempeh, soymilk, or roasted soy nuts. Supplements containing soy isoflavones, such as  Promensil, reduce hot flashes in some studies.  ? Herbs: Supplements containing certain herbs like black cohosh, such as Remifemin, decrease hot   flashes  in some studies.   Flax seed oil (3 gms daily) has shown to decrease hot flashes  Prescription therapies  The following prescription medications reduce hot flashes more than placebos in scientific studies. They   may be good options if you have frequent, bothersome hot flashes. Every medication has risks and side   effects. Review your medical history with your healthcare provider when considering a prescription   medication.  Hormonal options  ? Prescription hormone therapy with estrogen is the most effective treatment for hot flashes. Although   using hormones can increase your risk of breast cancer and cardiovascular disease, studies show   that benefits may outweigh risks for healthy women younger than age 60 with moderate to severe   hot flashes. The goal is to use the lowest dose of hormone therapy that treats your symptoms for the   shortest time necessary. Women with a uterus need to combine estrogen with a progestogen.  ? A new option for women with a uterus combines estrogen with bazedoxifene to protect the uterus  (Duavee). Bazedoxifene is an estrogen agonist/antagonist, which means it works like estrogen in   some tissues and opposes estrogen’s actions in others.   ? If it has not been a full year since your last period and you are a healthy nonsmoker, you may   consider a combination estrogen-progestin birth control pill. This will provide contraception, hot flash   relief, and regular periods.  Nonhormonal options  You also may consider the following nonhormonal medications. They are more effective than placebo in   scientific studies, although not as effective as hormone therapy. Low-dose paroxetine (Brisdelle) is the only  government-approved nonhormonal option for treating hot flashes.  ? Antidepressants: Certain drugs approved to treat depression reduce hot flashes in women without depression.   Effective drugs include paroxetine (Paxil), venlafaxine (Effexor), and escitalopram (Lexapro). You    should not take paroxetine if you take tamoxifen for breast cancer.  ? Gabapentin (Neurontin) is a drug approved to treat epilepsy, migraine, and nerve pain, but it also   reduces hot flashes. It can cause excessive sleepiness, so it is an especially good option if you have   bothersome night sweats and take your gabapentin at bedtime.   ? Sleeping medications such as Ambien, Lunesta, and Benadryl will not reduce your hot flashes but   may help you sleep through them. Available both by prescription and nonprescription.   Veozah is not a hormone. It targets the neural activity which causes hot flashes during menopause.   Patients taking Veozah should take one 45 milligram pill orally, once a day, with or without food. The pill should be taken at the same time each day. If a dose is missed, or not taken at the regular time, patients should take it as soon as possible and return to their regular schedule the following day.  The effectiveness of Veozah to treat moderate to severe hot flashes was demonstrated in each of the first 12-week, randomized, placebo-controlled, double-blind portions of two phase 3 clinical trials. In both trials, after the first 12 weeks, the women on placebo were then re-randomized to Veozah for a 40-week extension study to evaluate safety. Each trial ran a total of 52 weeks. The average age of the trial participants was 54 years old.  The prescribing information for Veozah includes a warning for elevated hepatic transaminase, or liver injury. Before using Veozah, patients should have blood work done to test for liver damage. While on Veozah, routine bloodwork should be performed every three months for the first nine months of using the medication. Patients experiencing symptoms related to liver damage--such as nausea, vomiting, or yellowing of the skin and eyes--should contact a physician. Veozah cannot be used with CY inhibitors. Patients with known cirrhosis, severe renal damage or  end-stage renal disease should not take Veozah.   The most common side effects of Veozah include abdominal pain, diarrhea, insomnia, back pain, hot flush and elevated hepatic transaminases.   The FDA granted the Veozah application Priority Review designation.  The approval of Veozah was granted to [employer]

## 2025-06-29 PROBLEM — H10.30 ACUTE CONJUNCTIVITIS: Status: RESOLVED | Noted: 2025-06-29 | Resolved: 2025-06-29

## 2025-06-29 PROBLEM — H01.026: Status: ACTIVE | Noted: 2025-06-29

## 2025-06-29 PROBLEM — M25.461 EFFUSION, RIGHT KNEE: Status: ACTIVE | Noted: 2023-04-27

## 2025-06-29 PROBLEM — H04.123 DRY EYES: Status: RESOLVED | Noted: 2025-06-29 | Resolved: 2025-06-29

## 2025-06-29 PROBLEM — N95.1 PERIMENOPAUSAL VASOMOTOR SYMPTOMS: Status: ACTIVE | Noted: 2025-06-29

## 2025-06-29 PROBLEM — M17.9 OSTEOARTHRITIS OF KNEE: Status: ACTIVE | Noted: 2023-04-27

## 2025-06-29 NOTE — PROGRESS NOTES
Chief Complaint   Patient presents with    Annual     Annual exam          HPI:   Mary is 49 year old , here today annual gyne exam.    Reports vasomotor symptoms      Smoking: No  Drinking: Social  Drugs: Denies  Diet/Exercise: balanced and improving  Contraception: IUD  Routine STI screening: declines    Current Partners: not sexually active  Hx of STD's: No  Last Pap: , Hx abnormal pap: No  Last mammogram 2025  Menarche: 11  Cycles: amenorrhea IUD  Family history of breast, ovarian, or uterine cancer: none  Significant Gyn history: none    Breast Cancer risk assessment score not at increased risk  Qiana Hurst risk assessment calculator used. [email or web address]    Note: This is a gyn-only visit. Pt has PCP and is referred back to PCP for care of any non-gyn concerns listed above in the Problem List or, if does not have a PCP was referred to one today.    Pt offered for MA to be present during exam and patient declines.      HISTORY:  Past Medical History[1]   Hx Prior Abnormal Pap: No  Pap Date: 21  Pap Result Notes: negative    Past Surgical History[2]   Family History[3]   Social History: Short Social Hx on File[4]    Safe relationship/safe home environment Y     Depression Screening (PHQ-2/PHQ-9): Over the LAST 2 WEEKS   Little interest or pleasure in doing things (over the last two weeks)?: Not at all    Feeling down, depressed, or hopeless (over the last two weeks)?: Not at all    PHQ-2 SCORE: 0        Immunization History   Administered Date(s) Administered    Covid-19 Vaccine Pfizer 30 mcg/0.3 ml 02/10/2021, 2021, 10/10/2021    Covid-19 Vaccine Pfizer Bivalent 30mcg/0.3mL 2022    Pfizer Covid-19 Vaccine 30mcg/0.3ml 12yrs+ 10/15/2023        Medications (Active prior to today's visit):  Current Medications[5]    Allergies:  Allergies[6]    ROS:  Review of Systems   Constitutional: Negative.    Respiratory: Negative.     Cardiovascular: Negative.     Endocrine: Positive for heat intolerance. Negative for cold intolerance, polydipsia, polyphagia and polyuria.   Genitourinary: Negative.    Psychiatric/Behavioral: Negative.             PHYSICAL EXAM:  Vitals:    06/18/25 0927   BP: 102/69   Pulse: 60     Physical Exam  Vitals reviewed.   Constitutional:       General: She is not in acute distress.     Appearance: Normal appearance. She is not ill-appearing, toxic-appearing or diaphoretic.   Pulmonary:      Effort: Pulmonary effort is normal.   Chest:   Breasts:     Right: Normal. No swelling, bleeding, inverted nipple, mass, nipple discharge, skin change or tenderness.      Left: Normal. No swelling, bleeding, inverted nipple, mass, nipple discharge, skin change or tenderness.   Genitourinary:     Labia:         Right: No rash, tenderness, lesion or injury.         Left: No rash, tenderness, lesion or injury.       Vagina: Normal.      Comments: IUD strings visualized  Lymphadenopathy:      Upper Body:      Right upper body: No axillary adenopathy.      Left upper body: No axillary adenopathy.   Skin:     General: Skin is warm and dry.   Neurological:      Mental Status: She is alert and oriented to person, place, and time.              No results found for this or any previous visit (from the past 24 hours).        ASSESSMENT/PLAN:     Mary was seen today for annual.    Diagnoses and all orders for this visit:    Women's annual routine gynecological examination  -     CBC, Platelet; No Differential; Future  -     Comp Metabolic Panel (14); Future  -     Hemoglobin A1C; Future  -     Lipid Panel; Future  -     [facility] SALIMA 2D+3D SCREENING BILAT (CPT=77067/65077); Future  -     Assay, Thyroid Stim Hormone; Future    IUD check up    Screening mammogram for breast cancer  -     [facility] SALIMA 2D+3D SCREENING BILAT (CPT=77067/25012); Future    Screening for deficiency anemia  -     CBC, Platelet; No Differential; Future    Screening for thyroid disorder  -     Assay, Thyroid  Stim Hormone; Future    Screening for lipid disorders  -     Lipid Panel; Future    Screening for diabetes mellitus  -     Hemoglobin A1C; Future    Screening for metabolic disorder  -     Comp Metabolic Panel (14); Future    Perimenopausal vasomotor symptoms  -     Hemoglobin A1C; Future  -     Assay, Thyroid Stim Hormone; Future       After completion of labs pt will schedule visit for discussion on treating hot flashes  Written information provided    Reviewed PAP guidelines recommending q 5 year screening with HPV testing.   Discussed breast awareness and SBE, and recommendations regarding mammography.  Order provided for screening mammogram.  Discussed weight management and regular exercise  Discussed Calcium and Vitamin D to maintain bone density  Discussed need for contraception until menopause, and patient has chosen abstinence and IUD  Offered patient STI screening and she declines        Patient verbalized understanding, All questions answered. No barriers to learning identified        [1]   Past Medical History:   Depression    Osteoarthritis    PONV (postoperative nausea and vomiting)    after general anesthesia -per patient    Ruptured lumbar disc   [2]   Past Surgical History:  Procedure Laterality Date          ,     Colonoscopy N/A 10/23/2023    Procedure: COLONOSCOPY;  Surgeon: Mele Pinto MD;  Location: [facility]    Other surgical history  , ,     ACL REPAIR   [3]   Family History  Problem Relation Age of Onset    Lipids Father     Alcohol and Other Disorders Associated Father     Hypertension Mother     Thyroid Disorder Mother     Depression Mother     Cancer Mother 71        endometrial cancer    Diabetes Maternal Grandfather     Thyroid Disorder Sister     Depression Sister     Anxiety Sister     ADHD Son     Anxiety Son     Depression Maternal Grandmother     Depression Brother     Depression Niece     Breast Cancer Neg     Ovarian Cancer Neg    [4]    Social History  Socioeconomic History    Marital status: Legally    Tobacco Use    Smoking status: Never    Smokeless tobacco: Never   Vaping Use    Vaping status: Never Used   Substance and Sexual Activity    Alcohol use: Yes     Comment: socially    Drug use: No   [5]   Current Outpatient Medications   Medication Sig Dispense Refill    buPROPion  MG Oral Tablet 24 Hr Take 1 tablet (150 mg total) by mouth daily. 90 tablet 1    FLUoxetine 10 MG Oral Cap Take 1 capsule (10 mg total) by mouth in the morning. Take along with 20mg capsule to equal 30mg po daily. 90 capsule 0    traZODone 50 MG Oral Tab Take 0.25-0.5 tablets (12.5-25 mg total) by mouth nightly as needed for Sleep. 45 tablet 1    FLUoxetine HCl 20 MG Oral Tab Take 1 tablet (20 mg total) by mouth daily. 90 tablet 1    Cholecalciferol (VITAMIN D) 50 MCG (2000 UT) Oral Cap Take 2 capsules (4,000 Units total) by mouth. 30 capsule 0    omega-3 fatty acids (FISH OIL) 1000 MG Oral Cap Take 1,000 mg by mouth daily.     [6]   Allergies  Allergen Reactions    Morphine RASH, NAUSEA AND VOMITING and UNKNOWN

## 2025-07-01 ENCOUNTER — TELEMEDICINE (OUTPATIENT)
Dept: OBGYN CLINIC | Facility: CLINIC | Age: 50
End: 2025-07-01
Payer: COMMERCIAL

## 2025-07-01 DIAGNOSIS — N95.1 VAGINAL DRYNESS, MENOPAUSAL: ICD-10-CM

## 2025-07-01 DIAGNOSIS — N95.1 PERIMENOPAUSAL VASOMOTOR SYMPTOMS: Primary | ICD-10-CM

## 2025-07-01 PROCEDURE — 98006 SYNCH AUDIO-VIDEO EST MOD 30: CPT | Performed by: ADVANCED PRACTICE MIDWIFE

## 2025-07-01 RX ORDER — ESTRADIOL 0.25 MG/.25G
1 GEL TOPICAL
Qty: 30 EACH | Refills: 0 | Status: SHIPPED | OUTPATIENT
Start: 2025-07-01

## 2025-07-01 RX ORDER — ESTRADIOL 10 UG/1
10 TABLET, FILM COATED VAGINAL
Qty: 8 TABLET | Refills: 11 | Status: SHIPPED | OUTPATIENT
Start: 2025-07-03 | End: 2025-08-02

## 2025-07-01 NOTE — PROGRESS NOTES
Virtual Telephone Check-In    Mary Maharaj verbally consents to a Virtual/Telephone Check-In visit on 07/01/25.  Patient has been referred to the [facility] website at [email or web address] to review the yearly Consent to Treat document.    Patient understands and accepts financial responsibility for any deductible, co-insurance and/or co-pays associated with this service.    Duration of the service: 30 minutes      Summary of topics discussed:     Menstrual cycles: last cycle   Unexplained vaginal bleeding: no  Hypertension: N  Diabetes: N  Hyperlipidemia: reviewed elevated lipids will make dietary changes and follow up with PCP  Active Liver Disease: N  Hx of or Family Hx Breast Cancer 1st degree relative: N  Hx DVT or stroke: N  Hx TIA: N  Smoker: N  Depression: Y meds  Hot Flashes:  Y  Vaginal dryness: Y  Memory: Y  Joint Aches/Pains:   N  Sleep Disturbances:  Y    Hormone Replacement Therapy (HRT) can significantly improve quality of life during menopause by alleviating symptoms like hot flashes, night sweats, and vaginal dryness, while also potentially reducing the risk of osteoporosis and heart disease.   Here's a more detailed look at the benefits of HRT for menopause:  Relieving Menopausal Symptoms:  Vasomotor Symptoms:  HRT, particularly estrogen therapy, is highly effective in reducing or eliminating hot flashes and night sweats, which are common and often disruptive symptoms of menopause.   Genitourinary Symptoms:  HRT can alleviate vaginal dryness, itching, and pain during intercourse, improving sexual function and comfort.   Sleep Disturbances:  By addressing hormonal imbalances, HRT can help improve sleep quality, leading to better rest and daytime energy levels.   Mood Swings and Anxiety:  Some women experience mood changes and anxiety during menopause, and HRT can help stabilize mood and reduce these symptoms.   Long-Term Health Benefits:  Bone Health:  HRT, especially estrogen therapy, can help  prevent bone loss and reduce the risk of osteoporosis and fractures, particularly important for women at higher risk of bone fractures.   Cardiovascular Health:  Some studies suggest that HRT, particularly if initiated early in menopause, may reduce the risk of heart disease and improve cholesterol levels.   Cognitive Function:  While research is ongoing, some studies suggest that HRT may help maintain cognitive function and reduce the risk of cognitive decline, including dementia.   Muscle Strength:  HRT can help maintain muscle strength, which is important for overall health and mobility, especially as women age.   Diabetes Risk:  Some research suggests that HRT may reduce the risk of developing type 2 diabetes, although this is not a universally established benefit.   Important Considerations:  Not a One-Size-Fits-All Solution:  HRT is not suitable for everyone, and the decision to use it should be made in consultation with a healthcare provider, considering individual risks and benefits.   Risks and Benefits:  While HRT offers significant benefits, it also carries potential risks, such as an increased risk of blood clots, stroke, and certain types of cancer.   Reviewed with patient that transdermal HRT may pose less cardiovascular risks.  Reviewed estrogen must always be opposed with progesterone  Discussed the methods of action of Mirena and that although not FDA approved for this indication will likely have the same effect as oral progesterone  Pt desires to use Mirena for this benefit  Reviewed that using vaginal estrogen can help dryness and irritation but it will not treat other symptoms of the menopause such as hot flushes, mood swings or problems sleeping (insomnia). Vaginal estrogen comes as a tablet, pessary, cream, gel or ring that you insert into your vagina.  Discussed that it is not systemically absorbed     Will follow up in 3 months      Diagnoses and all orders for this visit:    Perimenopausal  vasomotor symptoms  -     Estradiol (DIVIGEL) 0.25 MG/0.25GM Transdermal Gel; Place 1 Application onto the skin twice a week.    Vaginal dryness, menopausal  -     Estradiol (VAGIFEM) 10 MCG Vaginal Tab; Place 10 mcg vaginally twice a week.         Stephanie Jerez CNM

## (undated) DEVICE — KIT ENDO ORCAPOD 160/180/190

## (undated) DEVICE — SNARE ENDOSCOPIC 10MM ROUND

## (undated) DEVICE — FORCEPS BX L240CM DIA2.4MM L NDL RAD JAW 4

## (undated) DEVICE — KIT CLEAN ENDOKIT 1.1OZ GOWNX2

## (undated) DEVICE — LINE MNTR ADLT SET O2 INTMD

## (undated) DEVICE — MEDI-VAC NON-CONDUCTIVE SUCTION TUBING 6MM X 1.8M (6FT.) L: Brand: CARDINAL HEALTH

## (undated) DEVICE — 60 ML SYRINGE REGULAR TIP: Brand: MONOJECT

## (undated) NOTE — LETTER
3/9/2019              [address] [place] [address]         Dear Barbie Pimentel,    [facility] records indicate that the mammogram test ordered for you by Aleck Paget, APRN  has not been done.   If you have, in fact, already completed th

## (undated) NOTE — MR AVS SNAPSHOT
After Visit Summary   6/21/2021    Kandace Hernandez    MRN: [id number]           Visit Information     Date & Time  6/21/2021 11:00 AM Provider  [address] [address]  Dept.  Phone  [phone] 6/21/2021 6/21/2022    LIPID PANEL [[id number] CUSTOM]  6/21/2021 (Approximate) 6/21/2022    [facility] SALIMA 2D+3D SCREENING BILAT (CPT=77067/37231) [COMBO CPT(R)]  6/21/2021 (Approximate) 6/21/2022    THINPREP PAP SMEAR B [[id number] CUSTOM]  6/21/2021 6/21/2022    VI ways to make improvements. Your feedback will help us do so. For more information on [employer], please visit [email or web address]                   DO YOU KNOW WHERE TO GO? [facility] are never convenient.  If you are dealin locations or appointment options available at [facility],   visit [email or web address] or call [phone]. ([phone])

## (undated) NOTE — LETTER
AUTHORIZATION FOR SURGICAL OPERATION OR OTHER PROCEDURE    1.  I hereby authorize Terrace Dancer APN, and [facility] staff assigned to my case to perform the following operation and/or procedure at the [facility]: IUD removal & Re-insertion (M Patient Name:  ______________________________________________________  (please print)      Patient signature:  ___________________________________________________             Relationship to Patient:           []  Parent    Responsible person

## (undated) NOTE — LETTER
1/10/2018              Zulma Galvan        [address] [facility]         Dear Ana North,    It was a pleasure to see you. Your PAP test was normal.  There is no need for further testing at this time.   I look forward to seeing you at your

## (undated) NOTE — LETTER
[address] [facility] [address] [place], [place]  Authorization for Surgical Operation and Procedure                                                                                           I hereby authorize Ebony Garcia MD, my physician and his/her assistants (if applicable), which may include medical students, residents, and/or fellows, to perform the following surgical operation/ procedure and administer such anesthesia as may be determined necessary by my physician: Operation/Procedure name (s) COLONOSCOPY on Bentley Fish   2. I recognize that during the surgical operation/procedure, unforeseen conditions may necessitate additional or different procedures than those listed above. I, therefore, further authorize and request that the above-named surgeon, assistants, or designees perform such procedures as are, in their judgment, necessary and desirable. 3.   My surgeon/physician has discussed prior to my surgery the potential benefits, risks and side effects of this procedure; the likelihood of achieving goals; and potential problems that might occur during recuperation. They also discussed reasonable alternatives to the procedure, including risks, benefits, and side effects related to the alternatives and risks related to not receiving this procedure. I have had all my questions answered and I acknowledge that no guarantee has been made as to the result that may be obtained. 4.   Should the need arise during my operation/procedure, which includes change of level of care prior to discharge, I also consent to the administration of blood and/or blood products. Further, I understand that despite careful testing and screening of blood or blood products by collecting agencies, I may still be subject to ill effects as a result of receiving a blood transfusion and/or blood products.   The following are some, but not all, of the potential risks that can occur: fever and allergic reactions, hemolytic reactions, transmission of diseases such as Hepatitis, AIDS and Cytomegalovirus (CMV) and fluid overload. In the event that I wish to have an autologous transfusion of my own blood, or a directed donor transfusion, I will discuss this with my physician. Check only if Refusing Blood or Blood Products  I understand refusal of blood or blood products as deemed necessary by my physician may have serious consequences to my condition to include possible death. I hereby assume responsibility for my refusal and release the hospital, its personnel, and my physicians from any responsibility for the consequences of my refusal.    o  Refuse   5. I authorize the use of any specimen, organs, tissues, body parts or foreign objects that may be removed from my body during the operation/procedure for diagnosis, research or teaching purposes and their subsequent disposal by hospital authorities. I also authorize the release of specimen test results and/or written reports to my treating physician on the hospital medical staff or other referring or consulting physicians involved in my care, at the discretion of the Pathologist or my treating physician. 6.   I consent to the photographing or videotaping of the operations or procedures to be performed, including appropriate portions of my body for medical, scientific, or educational purposes, provided my identity is not revealed by the pictures or by descriptive texts accompanying them. If the procedure has been photographed/videotaped, the surgeon will obtain the original picture, image, videotape or CD. The hospital will not be responsible for storage, release or maintenance of the picture, image, tape or CD.    7.   I consent to the presence of a  or observers in the operating room as deemed necessary by my physician or their designees.     8.   I recognize that in the event my procedure results in extended X-Ray/fluoroscopy time, I may develop a skin reaction. 9. If I have a Do Not Attempt Resuscitation (DNAR) order in place, that status will be suspended while in the operating room, procedural suite, and during the recovery period unless otherwise explicitly stated by me (or a person authorized to consent on my behalf). The surgeon or my attending physician will determine when the applicable recovery period ends for purposes of reinstating the DNAR order. 10. Patients having a sterilization procedure: I understand that if the procedure is successful the results will be permanent and it will therefore be impossible for me to inseminate, conceive, or bear children. I also understand that the procedure is intended to result in sterility, although the result has not been guaranteed. 11. I acknowledge that my physician has explained sedation/analgesia administration to me including the risk and benefits I consent to the administration of sedation/analgesia as may be necessary or desirable in the judgment of my physician. I CERTIFY THAT I HAVE READ AND FULLY UNDERSTAND THE ABOVE CONSENT TO OPERATION and/or OTHER PROCEDURE.     _________________________________________ _________________________________     ___________________________________  Signature of Patient     Signature of Responsible Person                   Printed Name of Responsible Person                              _________________________________________ ______________________________        ___________________________________  Signature of Witness         Date  Time         Relationship to Patient    STATEMENT OF PHYSICIAN My signature below affirms that prior to the time of the procedure; I have explained to the patient and/or his/her legal representative, the risks and benefits involved in the proposed treatment and any reasonable alternative to the proposed treatment.  I have also explained the risks and benefits involved in refusal of the proposed treatment and alternatives to the proposed treatment and have answered the patient's questions.  If I have a significant financial interest in a co-management agreement or a significant financial interest in any product or implant, or other significant relationship used in this procedure/surgery, I have disclosed this and had a discussion with my patient.     _______________________________________________________________ _____________________________  Imani Dallas)                                                                                         (Date)                                   (Time)  Patient Name: Lorelei Baron    : 10/12/1975   Printed: 10/20/2023      Medical Record #: [id number]                                              Page 1 of 1